# Patient Record
Sex: FEMALE | HISPANIC OR LATINO | URBAN - METROPOLITAN AREA
[De-identification: names, ages, dates, MRNs, and addresses within clinical notes are randomized per-mention and may not be internally consistent; named-entity substitution may affect disease eponyms.]

---

## 2017-06-24 ENCOUNTER — OFFICE VISIT (OUTPATIENT)
Dept: URGENT CARE | Facility: CLINIC | Age: 42
End: 2017-06-24

## 2017-06-24 VITALS
TEMPERATURE: 98.4 F | OXYGEN SATURATION: 94 % | HEIGHT: 64 IN | DIASTOLIC BLOOD PRESSURE: 92 MMHG | BODY MASS INDEX: 31.24 KG/M2 | WEIGHT: 183 LBS | SYSTOLIC BLOOD PRESSURE: 110 MMHG | HEART RATE: 78 BPM | RESPIRATION RATE: 14 BRPM

## 2017-06-24 DIAGNOSIS — R06.2 WHEEZING: ICD-10-CM

## 2017-06-24 PROCEDURE — 94640 AIRWAY INHALATION TREATMENT: CPT | Performed by: FAMILY MEDICINE

## 2017-06-24 PROCEDURE — 99203 OFFICE O/P NEW LOW 30 MIN: CPT | Mod: 25 | Performed by: FAMILY MEDICINE

## 2017-06-24 RX ORDER — MONTELUKAST SODIUM 10 MG/1
10 TABLET ORAL DAILY
COMMUNITY

## 2017-06-24 RX ORDER — IPRATROPIUM BROMIDE AND ALBUTEROL SULFATE 2.5; .5 MG/3ML; MG/3ML
3 SOLUTION RESPIRATORY (INHALATION) ONCE
Status: COMPLETED | OUTPATIENT
Start: 2017-06-24 | End: 2017-06-24

## 2017-06-24 RX ORDER — ALBUTEROL SULFATE 90 UG/1
2 AEROSOL, METERED RESPIRATORY (INHALATION) EVERY 6 HOURS PRN
Qty: 8.5 G | Refills: 6 | Status: SHIPPED | OUTPATIENT
Start: 2017-06-24

## 2017-06-24 RX ORDER — PREDNISONE 20 MG/1
40 TABLET ORAL EVERY MORNING
Qty: 12 TAB | Refills: 0 | Status: SHIPPED | OUTPATIENT
Start: 2017-06-24 | End: 2017-06-30

## 2017-06-24 RX ORDER — DEXAMETHASONE SODIUM PHOSPHATE 10 MG/ML
10 INJECTION INTRAMUSCULAR; INTRAVENOUS ONCE
Status: COMPLETED | OUTPATIENT
Start: 2017-06-24 | End: 2017-06-24

## 2017-06-24 RX ORDER — MONTELUKAST SODIUM 10 MG/1
10 TABLET ORAL DAILY
Qty: 30 TAB | Refills: 6 | Status: SHIPPED | OUTPATIENT
Start: 2017-06-24

## 2017-06-24 RX ADMIN — DEXAMETHASONE SODIUM PHOSPHATE 10 MG: 10 INJECTION INTRAMUSCULAR; INTRAVENOUS at 09:35

## 2017-06-24 RX ADMIN — IPRATROPIUM BROMIDE AND ALBUTEROL SULFATE 3 ML: 2.5; .5 SOLUTION RESPIRATORY (INHALATION) at 09:36

## 2017-06-24 ASSESSMENT — ENCOUNTER SYMPTOMS
FEVER: 0
DIZZINESS: 0
CHILLS: 0
FOCAL WEAKNESS: 0
COUGH: 1
WHEEZING: 1

## 2017-06-24 NOTE — PROGRESS NOTES
Subjective:      Claribel Orta is a 41 y.o. female who presents with Asthma    Chief Complaint   Patient presents with   • Asthma        - This is a very pleasant 41 y.o. female with complaints of increased wheezing cough x 1 day. Has been acting up past month but bad last 1 day. Out of all usual asthma meds. No NVFC          ALLERGIES:  Review of patient's allergies indicates not on file.     PMH:  History reviewed. No pertinent past medical history.     MEDS:    Current outpatient prescriptions:   •  albuterol (PROVENTIL) 2.5 mg/0.5 mL Nebu Soln, by Nebulization route ONCE (RT)., Disp: , Rfl:   •  montelukast (SINGULAIR) 10 MG Tab, Take 10 mg by mouth every day., Disp: , Rfl:   •  albuterol 108 (90 BASE) MCG/ACT Aero Soln inhalation aerosol, Inhale 2 Puffs by mouth every 6 hours as needed for Shortness of Breath., Disp: 8.5 g, Rfl: 6  •  predniSONE (DELTASONE) 20 MG Tab, Take 2 Tabs by mouth every morning for 6 days., Disp: 12 Tab, Rfl: 0  •  montelukast (SINGULAIR) 10 MG Tab, Take 1 Tab by mouth every day., Disp: 30 Tab, Rfl: 6    Current facility-administered medications:   •  ipratropium-albuterol (DUONEB) nebulizer solution 3 mL, 3 mL, Nebulization, Once, Valdez Thakur M.D.  •  dexamethasone (DECADRON) injection (check route below) 10 mg, 10 mg, Intramuscular, Once, Valdez Thakur M.D.    ** I have documented what I find to be significant in regards to past medical, social, family and surgical history  in my HPI or under PMH/PSH/FH review section, otherwise it is contributory **             Asthma  She complains of cough and wheezing. Pertinent negatives include no fever. Her past medical history is significant for asthma.       Review of Systems   Constitutional: Negative for fever and chills.   Respiratory: Positive for cough and wheezing.    Neurological: Negative for dizziness and focal weakness.          Objective:     /92 mmHg  Pulse 78  Temp(Src) 36.9 °C (98.4 °F)  Resp 14  Ht  "1.626 m (5' 4\")  Wt 83.008 kg (183 lb)  BMI 31.40 kg/m2  SpO2 94%     Physical Exam   Constitutional: She appears well-developed. No distress.   HENT:   Head: Normocephalic and atraumatic.   Mouth/Throat: Oropharynx is clear and moist.   Eyes: Conjunctivae are normal.   Neck: Neck supple.   Cardiovascular: Regular rhythm.    No murmur heard.  Pulmonary/Chest: Effort normal. No respiratory distress. She has wheezes.   Neurological: She is alert. She exhibits normal muscle tone.   Skin: Skin is warm and dry.   Psychiatric: She has a normal mood and affect. Judgment normal.   Nursing note and vitals reviewed.              Assessment/Plan:         1. Wheezing  ipratropium-albuterol (DUONEB) nebulizer solution 3 mL    dexamethasone (DECADRON) injection (check route below) 10 mg    albuterol 108 (90 BASE) MCG/ACT Aero Soln inhalation aerosol    predniSONE (DELTASONE) 20 MG Tab    montelukast (SINGULAIR) 10 MG Tab             Dx & d/c instructions discussed w/ patient and/or family members. Follow up w/ Prvt Dr or here in 3-4 days if not getting better, sooner if needed,  ER if worse and UC/PCP unavailable.        Possible side effects (i.e. Rash, GI upset/constipation, sedation, elevation of BP or sugars) of any medications given discussed.                "

## 2017-06-24 NOTE — MR AVS SNAPSHOT
"        Claribel MarquesJoseVidales   2017 9:00 AM   Office Visit   MRN: [id number]    Department:  [facility]   Dept Phone:  [phone]    Description:  Female : 1975   Provider:  Valdez Thakur M.D.           Reason for Visit     Asthma           Allergies as of 2017     No Known Allergies      You were diagnosed with     Wheezing   [786.07.ICD-9-CM]         Vital Signs     Blood Pressure Pulse Temperature Respirations Height Weight    110/92 mmHg 78 36.9 °C (98.4 °F) 14 1.626 m (5' 4\") 83.008 kg (183 lb)    Body Mass Index Oxygen Saturation Smoking Status             31.40 kg/m2 94% Never Smoker          Basic Information     Date Of Birth Sex Race Ethnicity Preferred Language    1975 Female  or   Origin (Finnish,Fijian,Pakistani,Jaswant, etc) Finnish      Health Maintenance     Patient has no pending health maintenance at this time      Current Immunizations     No immunizations on file.      Below and/or attached are the medications your provider expects you to take. Review all of your home medications and newly ordered medications with your provider and/or pharmacist. Follow medication instructions as directed by your provider and/or pharmacist. Please keep your medication list with you and share with your provider. Update the information when medications are discontinued, doses are changed, or new medications (including over-the-counter products) are added; and carry medication information at all times in the event of emergency situations     Allergies:  No Known Allergies          Medications  Valid as of: 2017 -  9:34 AM    Generic Name Brand Name Tablet Size Instructions for use    albuterol (PROVENTIL) 2.5 mg/0.5 mL Nebu Soln (Nebu Soln) PROVENTIL 2.5 mg/0.5 mL by Nebulization route ONCE (RT).        Albuterol Sulfate (Aero Soln) albuterol 108 (90 BASE) MCG/ACT Inhale 2 Puffs by mouth every 6 hours as needed for Shortness of Breath.       " Montelukast Sodium (Tab) SINGULAIR 10 MG Take 10 mg by mouth every day.        Montelukast Sodium (Tab) SINGULAIR 10 MG Take 1 Tab by mouth every day.        PredniSONE (Tab) DELTASONE 20 MG Take 2 Tabs by mouth every morning for 6 days.        .                 Medicines prescribed today were sent to:     [id number] - [place], [place] - [address]    [address] [place] [address]    Phone: [phone] Fax: [phone]    Open 24 Hours?: No      Medication refill instructions:       If your prescription bottle indicates you have medication refills left, it is not necessary to call your provider’s office. Please contact your pharmacy and they will refill your medication.    If your prescription bottle indicates you do not have any refills left, you may request refills at any time through one of the following ways: The online latakoo system (except Urgent Care), by calling your provider’s office, or by asking your pharmacy to contact your provider’s office with a refill request. Medication refills are processed only during regular business hours and may not be available until the next business day. Your provider may request additional information or to have a follow-up visit with you prior to refilling your medication.   *Please Note: Medication refills are assigned a new Rx number when refilled electronically. Your pharmacy may indicate that no refills were authorized even though a new prescription for the same medication is available at the pharmacy. Please request the medicine by name with the pharmacy before contacting your provider for a refill.           latakoo Access Code: [email or web address]  Expires: 7/24/2017  9:34 AM    Your email address is not on file at [facility].  Email Addresses are required for you to sign up for latakoo, please contact [phone] to verify your personal information and to provide your email address prior to attempting to register for latakoo.    Claribel  Marivel  [address] [place] [address]    [employer]  A secure, online tool to manage your health information     [employer]’s Walkbase® is a secure, online tool that connects you to your personalized health information from the privacy of your home -- day or night - making it very easy for you to manage your healthcare. Once the activation process is completed, you can even access your medical information using the Walkbase amaya, which is available for free in the Apple Amaya store or Google Play store.     To learn more about Walkbase, visit [email or web address]    There are two levels of access available (as shown below):   My Chart Features  [facility] Primary Care Doctor [facility]  Specialists [facility]  Urgent  Care Non-[facility] Primary Care Doctor   Email your healthcare team securely and privately 24/7 X X X    Manage appointments: schedule your next appointment; view details of past/upcoming appointments X      Request prescription refills. X      View recent personal medical records, including lab and immunizations X X X X   View health record, including health history, allergies, medications X X X X   Read reports about your outpatient visits, procedures, consult and ER notes X X X X   See your discharge summary, which is a recap of your hospital and/or ER visit that includes your diagnosis, lab results, and care plan X X  X     How to register for Walkbase:  Once your e-mail address has been verified, follow the following steps to sign up for Walkbase.     1. Go to  [email or web address]  2. Click on the Sign Up Now box, which takes you to the New Member Sign Up page. You will need to provide the following information:  a. Enter your Walkbase Access Code exactly as it appears at the top of this page. (You will not need to use this code after you’ve completed the sign-up process. If you do not sign up before the expiration date, you must request a new code.)   b. Enter your date of birth.   c. Enter your home  email address.   d. Click Submit, and follow the next screen’s instructions.  3. Create a Tatangot ID. This will be your [employer] login ID and cannot be changed, so think of one that is secure and easy to remember.  4. Create a Tatangot password. You can change your password at any time.  5. Enter your Password Reset Question and Answer. This can be used at a later time if you forget your password.   6. Enter your e-mail address. This allows you to receive e-mail notifications when new information is available in [employer].  7. Click Sign Up. You can now view your health information.    For assistance activating your [employer] account, call [phone]